# Patient Record
Sex: MALE | Race: WHITE | NOT HISPANIC OR LATINO | URBAN - METROPOLITAN AREA
[De-identification: names, ages, dates, MRNs, and addresses within clinical notes are randomized per-mention and may not be internally consistent; named-entity substitution may affect disease eponyms.]

---

## 2023-04-18 ENCOUNTER — EMERGENCY (EMERGENCY)
Facility: HOSPITAL | Age: 58
LOS: 1 days | Discharge: HOME | End: 2023-04-18
Attending: STUDENT IN AN ORGANIZED HEALTH CARE EDUCATION/TRAINING PROGRAM | Admitting: STUDENT IN AN ORGANIZED HEALTH CARE EDUCATION/TRAINING PROGRAM
Payer: COMMERCIAL

## 2023-04-18 VITALS
DIASTOLIC BLOOD PRESSURE: 92 MMHG | OXYGEN SATURATION: 100 % | RESPIRATION RATE: 17 BRPM | SYSTOLIC BLOOD PRESSURE: 154 MMHG | TEMPERATURE: 98 F | HEART RATE: 73 BPM

## 2023-04-18 PROCEDURE — 99284 EMERGENCY DEPT VISIT MOD MDM: CPT

## 2023-04-18 PROCEDURE — 93010 ELECTROCARDIOGRAM REPORT: CPT

## 2023-04-18 NOTE — ED ADULT TRIAGE NOTE - CHIEF COMPLAINT QUOTE
pt c/o numbness to b/l thumb, pointer and middle finger and "puffiness" to the tongue pt speaking in clear sentences, no stridor or slurring noted.

## 2023-04-18 NOTE — ED PROVIDER NOTE - CLINICAL SUMMARY MEDICAL DECISION MAKING FREE TEXT BOX
58M, no sig pmh, who presents with b/l paresthesias over digits 1/2/3. Physical exam and story potentially consistent with carpal tunnel syndrome especially given that patient is on his computer for extended periods of time. Sent in for HCT.

## 2023-04-18 NOTE — ED PROVIDER NOTE - OBJECTIVE STATEMENT
58M, no sig pmh, who presents with finger pain/numbness. For the past few weeks, reports paresthesias and discomfort over his 1/2/3 bilaterally. 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 Otherwise, denies headaches, fevers, chills, cough, sputum, cp, sob, abdominal pain, nvd, dysuria, hematuria, recent travel, trauma, syncope, black/bloody stools. No hx of strokes. Denies hx of htn/dm or vascular disease.

## 2023-04-18 NOTE — ED PROVIDER NOTE - PATIENT PORTAL LINK FT
You can access the FollowMyHealth Patient Portal offered by [facility] by registering at the following website: [email or web address]. By joining [employer]’s FollowMyHealth portal, you will also be able to view your health information using other applications (apps) compatible with our system.

## 2023-04-18 NOTE — ED PROVIDER NOTE - PHYSICAL EXAMINATION
Afebrile, well appearing, rrr, ctabl, abdomen soft, no le edema, gomez spontaneously, stable gait  Both Hands: SILT over MRU, no scaphoid ttp, FDP/FDS intact, AIN/PIN/U intact,

## 2023-04-19 VITALS
DIASTOLIC BLOOD PRESSURE: 94 MMHG | HEART RATE: 75 BPM | OXYGEN SATURATION: 99 % | SYSTOLIC BLOOD PRESSURE: 155 MMHG | RESPIRATION RATE: 16 BRPM

## 2023-04-19 PROCEDURE — 70450 CT HEAD/BRAIN W/O DYE: CPT | Mod: 26,MG

## 2023-04-19 PROCEDURE — G1004: CPT

## 2023-04-19 NOTE — ED ADULT NURSE NOTE - ED STAT RN HANDOFF DETAILS
Pt received from intake pending imaging results. A&O x 4, resting comfortably. VSS. Respirations even and unlabored. Cleared for d/c.

## 2023-04-26 ENCOUNTER — EMERGENCY (EMERGENCY)
Facility: HOSPITAL | Age: 58
LOS: 1 days | Discharge: HOME | End: 2023-04-26
Attending: EMERGENCY MEDICINE | Admitting: EMERGENCY MEDICINE
Payer: COMMERCIAL

## 2023-04-26 VITALS
DIASTOLIC BLOOD PRESSURE: 101 MMHG | TEMPERATURE: 98 F | SYSTOLIC BLOOD PRESSURE: 150 MMHG | OXYGEN SATURATION: 100 % | HEART RATE: 103 BPM | RESPIRATION RATE: 17 BRPM

## 2023-04-26 VITALS
OXYGEN SATURATION: 100 % | RESPIRATION RATE: 16 BRPM | HEART RATE: 92 BPM | DIASTOLIC BLOOD PRESSURE: 83 MMHG | SYSTOLIC BLOOD PRESSURE: 164 MMHG | TEMPERATURE: 98 F

## 2023-04-26 LAB
ALBUMIN SERPL ELPH-MCNC: 4.4 G/DL — SIGNIFICANT CHANGE UP (ref 3.3–5)
ALP SERPL-CCNC: 74 U/L — SIGNIFICANT CHANGE UP (ref 40–120)
ALT FLD-CCNC: 32 U/L — SIGNIFICANT CHANGE UP (ref 4–41)
ANION GAP SERPL CALC-SCNC: 13 MMOL/L — SIGNIFICANT CHANGE UP (ref 7–14)
AST SERPL-CCNC: 23 U/L — SIGNIFICANT CHANGE UP (ref 4–40)
BASOPHILS # BLD AUTO: 0.09 K/UL — SIGNIFICANT CHANGE UP (ref 0–0.2)
BASOPHILS NFR BLD AUTO: 0.9 % — SIGNIFICANT CHANGE UP (ref 0–2)
BILIRUB SERPL-MCNC: 0.4 MG/DL — SIGNIFICANT CHANGE UP (ref 0.2–1.2)
BUN SERPL-MCNC: 15 MG/DL — SIGNIFICANT CHANGE UP (ref 7–23)
CALCIUM SERPL-MCNC: 9.6 MG/DL — SIGNIFICANT CHANGE UP (ref 8.4–10.5)
CHLORIDE SERPL-SCNC: 102 MMOL/L — SIGNIFICANT CHANGE UP (ref 98–107)
CO2 SERPL-SCNC: 23 MMOL/L — SIGNIFICANT CHANGE UP (ref 22–31)
CREAT SERPL-MCNC: 1.08 MG/DL — SIGNIFICANT CHANGE UP (ref 0.5–1.3)
EGFR: 79 ML/MIN/1.73M2 — SIGNIFICANT CHANGE UP
EOSINOPHIL # BLD AUTO: 0.09 K/UL — SIGNIFICANT CHANGE UP (ref 0–0.5)
EOSINOPHIL NFR BLD AUTO: 0.9 % — SIGNIFICANT CHANGE UP (ref 0–6)
GLUCOSE SERPL-MCNC: 99 MG/DL — SIGNIFICANT CHANGE UP (ref 70–99)
HCT VFR BLD CALC: 42.8 % — SIGNIFICANT CHANGE UP (ref 39–50)
HGB BLD-MCNC: 14.3 G/DL — SIGNIFICANT CHANGE UP (ref 13–17)
IANC: 7.08 K/UL — SIGNIFICANT CHANGE UP (ref 1.8–7.4)
LYMPHOCYTES # BLD AUTO: 1.75 K/UL — SIGNIFICANT CHANGE UP (ref 1–3.3)
LYMPHOCYTES # BLD AUTO: 17.4 % — SIGNIFICANT CHANGE UP (ref 13–44)
MCHC RBC-ENTMCNC: 30.3 PG — SIGNIFICANT CHANGE UP (ref 27–34)
MCHC RBC-ENTMCNC: 33.4 GM/DL — SIGNIFICANT CHANGE UP (ref 32–36)
MCV RBC AUTO: 90.7 FL — SIGNIFICANT CHANGE UP (ref 80–100)
MONOCYTES # BLD AUTO: 0.69 K/UL — SIGNIFICANT CHANGE UP (ref 0–0.9)
MONOCYTES NFR BLD AUTO: 6.9 % — SIGNIFICANT CHANGE UP (ref 2–14)
NEUTROPHILS # BLD AUTO: 7.43 K/UL — HIGH (ref 1.8–7.4)
NEUTROPHILS NFR BLD AUTO: 73.9 % — SIGNIFICANT CHANGE UP (ref 43–77)
PLATELET # BLD AUTO: 111 K/UL — LOW (ref 150–400)
POTASSIUM SERPL-MCNC: 3.9 MMOL/L — SIGNIFICANT CHANGE UP (ref 3.5–5.3)
POTASSIUM SERPL-SCNC: 3.9 MMOL/L — SIGNIFICANT CHANGE UP (ref 3.5–5.3)
PROT SERPL-MCNC: 7 G/DL — SIGNIFICANT CHANGE UP (ref 6–8.3)
RBC # BLD: 4.72 M/UL — SIGNIFICANT CHANGE UP (ref 4.2–5.8)
RBC # FLD: 11.7 % — SIGNIFICANT CHANGE UP (ref 10.3–14.5)
SODIUM SERPL-SCNC: 138 MMOL/L — SIGNIFICANT CHANGE UP (ref 135–145)
WBC # BLD: 10.06 K/UL — SIGNIFICANT CHANGE UP (ref 3.8–10.5)
WBC # FLD AUTO: 10.06 K/UL — SIGNIFICANT CHANGE UP (ref 3.8–10.5)

## 2023-04-26 PROCEDURE — 99284 EMERGENCY DEPT VISIT MOD MDM: CPT

## 2023-04-26 NOTE — ED PROVIDER NOTE - CLINICAL SUMMARY MEDICAL DECISION MAKING FREE TEXT BOX
58-year-old male with no stated past medical history presenting to the ER with abnormal CT results.  Patient was seen in the ED 1 week ago with bilateral tingling/numbness to digits 1 through 3 on hands bilaterally, left more than right.  Patient also reports 2 episodes of feeling left-sided facial numbness over the past week.  Patient had CT head performed in the ED and following up with a neurologist.  This morning he had CT angio head and neck performed, patient states 3 hours later he was called by his neurologist Dr. Beata Chapman and told he needs to come to the ER for "blockages".  Pt is currently asymptomatic, vitals within normal, non focal neuro exam. Plan: attempt to get copy of outpt imaging, if unable probable rpt CTa head/neck, neuro consult.

## 2023-04-26 NOTE — CONSULT NOTE ADULT - SUBJECTIVE AND OBJECTIVE BOX
HPI:  58-year-old male ambidextrous (favors LH) former smoker (7 py), HLD, chronic neck pain presents to the ER with abnormal CT results. Patient was seen in the ED 1 week ago with intermittent bilateral tingling/numbness to digits 1 through 3 on hands bilaterally, left significantly more than right, since 4/11/23. Pt reports that it happens ~2x/day and is worse when typing, does not wake up with symptoms. He has been using wrist splints intermittently. On 4/18/23, pt reports an episode of 2-3 mins of L 2nd digit painful paresthesias and difficulty speaking (although he was speaking full sentences to his mother, no dysarthria noted, pt's mother at bedside confirmed). Pt is a rodriguez . Pt unable to obtain MRI due to R ear steel surgical staple.     Pt saw neurologist Dr. Beata Jimenez. This morning he had CT angio head and neck performed, patient states 3 hours later he was called by his neurologist and told he needs to come to the ER for "blockages" and was started on aspirin 81mg daily today. Pt unable to obtain MRI due to R ear steel surgical staple. Patient denies symptoms at present.  Patient denies numbness/tingling, weakness, difficulty speaking or swallowing, dizziness, feeling off balance walking, CP, SOB, abdominal pain, N/V/D, recent travel or illness or any other concerns.     REVIEW OF SYSTEMS  General: No fevers, chills  HEENT: No acute visual changes  CVS: No chest pain, palpitations  Resp: No cough, dyspnea, wheezing  GI: No abdominal pain, nausea, vomiting  : No dysuria, frequency  MSK: No joint pain or swelling  Ext: No edema, no claudication  Skin: No rashes or itching  Heme: No easy bruising or petechiae  Neuro: No confusion, LOC  Endocrine: No excessive heat or cold symptoms,     A 10-system ROS was performed and is negative except for those items noted above and/or in the HPI.    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)  Occupation: bass     MEDICATIONS (HOME):  Home Medications:    MEDICATIONS  (STANDING):    MEDICATIONS  (PRN):    ALLERGIES/INTOLERANCES:  Allergies  Allergy Status Unknown    Intolerances    VITALS & EXAMINATION:  Vital Signs Last 24 Hrs  T(C): 36.7 (26 Apr 2023 17:14), Max: 36.7 (26 Apr 2023 17:14)  T(F): 98 (26 Apr 2023 17:14), Max: 98 (26 Apr 2023 17:14)  HR: 103 (26 Apr 2023 17:14) (103 - 103)  BP: 150/101 (26 Apr 2023 17:14) (150/101 - 150/101)  BP(mean): --  RR: 17 (26 Apr 2023 17:14) (17 - 17)  SpO2: 100% (26 Apr 2023 17:14) (100% - 100%)    Parameters below as of 26 Apr 2023 17:14  Patient On (Oxygen Delivery Method): room air      General:  Constitutional: Obese Male, appears stated age, in no apparent distress including pain  Head: Normocephalic & atraumatic.  Respiratory: No increased work of breahting  Extremities: No cyanosis, clubbing, or edema.  Skin: No rashes, bruising, or discoloration.    Neurological (>12):  MS: Awake, alert, oriented to person, place, situation, time. Normal affect. Follows all commands.    Language: Speech is clear, fluent with good repetition & comprehension (able to name objects mask, thumb)    CNs: PERRL (R = 3mm, L = 3mm). VFF. EOMI no nystagmus, no diplopia. V1-3 intact to LT/pinprick, well developed masseter muscles b/l. No facial asymmetry b/l, full eye closure strength b/l. Hearing grossly normal (rubbing fingers) b/l but slightly decreased on R. Symmetric palate elevation in midline. Gag reflex deferred. Head turning & shoulder shrug intact b/l. Tongue midline, normal movements, no atrophy.    Motor: Normal muscle bulk & tone. No noticeable tremor or seizure. No pronator drift.              Deltoid	Biceps	Triceps	Wrist	Finger ABd	   R	5	5	5	4+	4+		4+ 	  L	5	5	5	5	5		5    	H-Flex	K-Flex	K-Ext	D-Flex	P-Flex  R	5	5	5	5	5	  L	5	5	5	5	5		     Sensation: Intact to LT/Temp b/l throughout. Slightly decreased to PP over R thumb palmar side.    Cortical: Extinction on DSS (neglect): none    Reflexes:              Biceps(C5)       BR(C6)     Triceps(C7)               Patellar(L4)    Achilles(S1)    Plantar Resp  R	2	          2	             2		        2		    1		Down   L	2	          2	             2		        2		    1		Down     Coordination: intact rapid-alt movements. No dysmetria to FTN    Gait:  Normal stance and gait.     LABORATORY:       STUDIES & IMAGING:    < from: CT Head No Cont (04.19.23 @ 00:56) >  IMPRESSION:    No acute intracranial pathology.    < end of copied text >    CTA report 4/26/23 [facility]:  IMPRESSION: Moderate to severe stenosis of supraclinoid right internal carotid artery, severe stenosis of the M1 segment of right middle cerebral artery.     Bilateral cavernous carotid artery calcifications are seen. HPI:  58-year-old male ambidextrous (favors LH) former smoker (7 py), HLD, chronic neck pain presents to the ER with abnormal CT results. Patient was seen in the ED 1 week ago with intermittent bilateral tingling/numbness to digits 1 through 3 on hands bilaterally, left significantly more than right, since 4/11/23. Pt reports that it happens ~2x/day and is worse when typing, does not wake up with symptoms. He has been using wrist splints intermittently. On 4/18/23, pt reports an episode of 2-3 mins of L 2nd digit painful paresthesias and difficulty speaking (although he was speaking full sentences to his mother, no dysarthria noted, pt's mother at bedside confirmed). 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 Pt unable to obtain MRI due to R ear steel surgical staple. Pt denies any prior stroke like symptoms, including difficulty ambulating, one sided weakness or numbness, dizziness, vision changes, speech disturbance.     Pt saw neurologist Dr. Beata Jimenez. This morning he had CT angio head and neck performed, patient states 3 hours later he was called by his neurologist and told he needs to come to the ER for "blockages" and was started on aspirin 81mg daily today. Pt unable to obtain MRI due to R ear steel surgical staple. Patient denies symptoms at present.  Patient denies numbness/tingling, weakness, difficulty speaking or swallowing, dizziness, feeling off balance walking, CP, SOB, abdominal pain, N/V/D, recent travel or illness or any other concerns.     REVIEW OF SYSTEMS  General: No fevers, chills  HEENT: No acute visual changes  CVS: No chest pain, palpitations  Resp: No cough, dyspnea, wheezing  GI: No abdominal pain, nausea, vomiting  : No dysuria, frequency  MSK: No joint pain or swelling  Ext: No edema, no claudication  Skin: No rashes or itching  Heme: No easy bruising or petechiae  Neuro: No confusion, LOC  Endocrine: No excessive heat or cold symptoms,     A 10-system ROS was performed and is negative except for those items noted above and/or in the HPI.    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)  Occupation: bass     MEDICATIONS (HOME):  Home Medications:    MEDICATIONS  (STANDING):    MEDICATIONS  (PRN):    ALLERGIES/INTOLERANCES:  Allergies  Allergy Status Unknown    Intolerances    VITALS & EXAMINATION:  Vital Signs Last 24 Hrs  T(C): 36.7 (26 Apr 2023 17:14), Max: 36.7 (26 Apr 2023 17:14)  T(F): 98 (26 Apr 2023 17:14), Max: 98 (26 Apr 2023 17:14)  HR: 103 (26 Apr 2023 17:14) (103 - 103)  BP: 150/101 (26 Apr 2023 17:14) (150/101 - 150/101)  BP(mean): --  RR: 17 (26 Apr 2023 17:14) (17 - 17)  SpO2: 100% (26 Apr 2023 17:14) (100% - 100%)    Parameters below as of 26 Apr 2023 17:14  Patient On (Oxygen Delivery Method): room air      General:  Constitutional: Obese Male, appears stated age, in no apparent distress including pain  Head: Normocephalic & atraumatic.  Respiratory: No increased work of breahting  Extremities: No cyanosis, clubbing, or edema.  Skin: No rashes, bruising, or discoloration.    Neurological (>12):  MS: Awake, alert, oriented to person, place, situation, time. Normal affect. Follows all commands.    Language: Speech is clear, fluent with good repetition & comprehension (able to name objects mask, thumb)    CNs: PERRL (R = 3mm, L = 3mm). VFF. EOMI no nystagmus, no diplopia. V1-3 intact to LT/pinprick, well developed masseter muscles b/l. No facial asymmetry b/l, full eye closure strength b/l. Hearing grossly normal (rubbing fingers) b/l but slightly decreased on R. Symmetric palate elevation in midline. Gag reflex deferred. Head turning & shoulder shrug intact b/l. Tongue midline, normal movements, no atrophy.    Motor: Normal muscle bulk & tone. No noticeable tremor or seizure. No pronator drift.              Deltoid	Biceps	Triceps	Wrist	Finger ABd	   R	5	5	5	4+	4+		4+ 	  L	5	5	5	5	5		5    	H-Flex	K-Flex	K-Ext	D-Flex	P-Flex  R	5	5	5	5	5	  L	5	5	5	5	5		     Sensation: Intact to LT/Temp b/l throughout. Slightly decreased to PP over R thumb palmar side.    Cortical: Extinction on DSS (neglect): none    Reflexes:              Biceps(C5)       BR(C6)     Triceps(C7)               Patellar(L4)    Achilles(S1)    Plantar Resp  R	2	          2	             2		        2		    1		Down   L	2	          2	             2		        2		    1		Down     Coordination: intact rapid-alt movements. No dysmetria to FTN    Gait:  Normal stance and gait.     LABORATORY:       STUDIES & IMAGING:    < from: CT Head No Cont (04.19.23 @ 00:56) >  IMPRESSION:    No acute intracranial pathology.    < end of copied text >    CTA report 4/26/23 [facility]:  IMPRESSION: Moderate to severe stenosis of supraclinoid right internal carotid artery, severe stenosis of the M1 segment of right middle cerebral artery.     Bilateral cavernous carotid artery calcifications are seen.

## 2023-04-26 NOTE — ED PROVIDER NOTE - CARE PROVIDER_API CALL
Adria Matthews)  Neurology; Vascular Neurology  [address] [facility] [facility] [address] [facility] [address], [address]  [place] [address]  Phone: [phone]  Fax: [phone]  Follow Up Time:

## 2023-04-26 NOTE — CONSULT NOTE ADULT - ASSESSMENT
58-year-old male ambidextrous (favors LH) former smoker (7 py), HLD presents to the ER with abnormal CT results. Patient was seen in the ED 1 week ago with intermittent bilateral tingling/numbness to digits 1 through 3 on hands bilaterally, left significantly more than right, since 4/11/23. Pt reports that it happens ~2x/day and is worse when typing, does not wake up with symptoms. Pt saw neurologist Dr. Beata Jimenez. This morning he had CT angio head and neck performed, patient states 3 hours later he was called by his neurologist and told he needs to come to the ER for "blockages".      Impression: b/l paresthesias and numbness likely 2/2 carpal tunnel. Multifocal ICAD, asymptomatic    Recommendations:  [] outpatient EMG/NCS and wrist splints for likely   [] Pt unable to obtain MRI due to R ear steel surgical staple  [] continue with ASA 81 mg PO daily   [] stroke risk factor modification and counseling; pt speficially counseled on smoking cessation, blood pressure log, cholesterol control, weight loss  [x] HA1c 5.5%, LDL 86 (total cholesterol 205) per outpatient labs  [] NPO until bedside dysphagia screen  [] Patient can follow up with Dr. Jimenez or Dr. Adria Matthews after discharge. Please instruct the patient to call [phone] to schedule an appointment within the next 2-3 days. Office is located at [address] [place] [address].    Case to be discussed with Dr. Beatty      58-year-old male ambidextrous (favors LH) former smoker (7 py), HLD presents to the ER with abnormal CT results. Patient was seen in the ED 1 week ago with intermittent bilateral tingling/numbness to digits 1 through 3 on hands bilaterally, left significantly more than right, since 4/11/23. Pt saw neurologist Dr. Beata Jimenez. This morning he had CT angio head and neck performed, patient states 3 hours later he was called by his neurologist and told he needs to come to the ER for "blockages".      Impression: intermittent b/l (L>>R) 1-3 digit paresthesias and numbness likely 2/2 b/l carpal tunnel. Multifocal ICAD, asymptomatic    Recommendations:  [] outpatient EMG/NCS and wrist splints   [] Pt unable to obtain MRI due to R ear steel surgical staple  [] continue with ASA 81 mg PO daily   [] stroke risk factor modification and counseling; pt specifically counseled on smoking cessation, blood pressure log, cholesterol control, weight loss  [x] HA1c 5.5%, LDL 86 (total cholesterol 205) per outpatient labs  [] NPO until bedside dysphagia screen  [] Patient can follow up with Dr. Beata Jimenez or Dr. Adria Matthews after discharge. Please instruct the patient to call [phone] to schedule an appointment within the next 2-3 days. Office is located at [address] [place] [address].    Case discussed with Dr. Beatty      58-year-old male ambidextrous (favors LH) former smoker (7 py), HLD presents to the ER with abnormal CT results. Patient was seen in the ED 1 week ago with intermittent bilateral tingling/numbness to digits 1 through 3 on hands bilaterally, left significantly more than right, since 4/11/23. Pt saw neurologist Dr. Beata Jimenez. This morning he had CT angio head and neck performed, patient states 3 hours later he was called by his neurologist and told he needs to come to the ER for "blockages".      Impression: intermittent b/l (L>>R) 1-3 digit paresthesias and numbness likely 2/2 b/l carpal tunnel. Multifocal ICAD, asymptomatic    Recommendations:  [] outpatient EMG/NCS and wrist splints   [] Pt unable to obtain MRI due to R ear steel surgical staple  [] continue with ASA 81 mg PO daily, statin  [] stroke risk factor modification and counseling; pt specifically counseled on smoking cessation, blood pressure log, cholesterol control, weight loss  [x] HA1c 5.5%, LDL 86 (total cholesterol 205) per outpatient labs  [] NPO until bedside dysphagia screen  [] Patient can follow up with Dr. Beata Jimenez or Dr. Adria Matthews after discharge. Please instruct the patient to call [phone] to schedule an appointment within the next 2-3 days. Office is located at [address] [place] [address], [place] [address].    Case discussed with Dr. Beatty

## 2023-04-26 NOTE — ED PROVIDER NOTE - OBJECTIVE STATEMENT
58-year-old male with no stated past medical history presenting to the ER with abnormal CT results.  Patient was seen in the ED 1 week ago with bilateral tingling/numbness to digits 1 through 3 on hands bilaterally, left more than right.  Patient also reports 2 episodes of feeling left-sided facial numbness over the past week.  Patient had CT head performed in the ED and following up with a neurologist.  This morning he had CT angio head and neck performed, patient states 3 hours later he was called by his neurologist Dr. Beata Chapman and told he needs to come to the ER for "blockages".  Patient denies symptoms at present.  Patient denies numbness/tingling, weakness, difficulty speaking or swallowing, dizziness, feeling off balance walking, CP, SOB, abdominal pain, N/V/D, recent travel or illness or any other concerns.

## 2023-04-26 NOTE — ED ADULT NURSE NOTE - OBJECTIVE STATEMENT
Break RN: Pt is a 57 y/o Male, A&Ox4, ambulatory with a no reported PMH. Pt sent to the ED for abnormal CT scan. Pt states he was recently in ED Respirations even and unlabored, chest rise equal b/l. NSR noted on cardiac monitor. VS as noted in flow sheets. Pt denies chest pain, SOB, fever, cough, chills, abdominal pain, N/V, h/a, dizziness or any urinary symptoms at this time. Safety maintained throughout. Will continue to monitor. Break RN: Pt is a 57 y/o Male, A&Ox4, ambulatory with a no reported PMH. Pt sent to the ED for abnormal CT scan. Pt states he received a CT head and neck that showed multiple "blockages" in vessels. Pt states he was recently seen in ED a week ago for numbness/tingling in digits 1-3 in b/l hands. Pt states he had 2 episodes of left-sided facial numbness over the past week but denies any symptoms at this time. Neuro/sensory intact, strength equal b/l. Respirations even and unlabored, chest rise equal b/l. Abdomen soft, nontender, nondistended. Skin warm, dry and intact. VS as noted in flow sheets. Pt denies chest pain, SOB, fever, cough, chills, abdominal pain, N/V, h/a, dizziness, numbness/tingling, weakness or any urinary symptoms at this time. 20g IVL placed in LAC. Labs collected and sent. Pt awaiting neuro eval. Safety maintained throughout. Will continue to monitor.

## 2023-04-26 NOTE — ED PROVIDER NOTE - PROGRESS NOTE DETAILS
YEMI Valencia: Spoke with neurologist  reports impression on CTA head and neck performed today as "moderate to severe stenosis of supraclinoid right internal carotid and severe stenosis M1 segment of right MCA".  He reports that the imaging center will be sending over a fax, states they have a disc prepared and are open until 7:30 PM if a friend or family member is able to  the disc for the patient.  Discussed with patient that we will need to have imaging to be able to review either via CD or repeating CT angio head and neck in ED today.  Patient's friend at bedside reports she will go to imaging center at [address] to  the disc.  Spoke with neurology who will consult and ENT. ADAM:  Neurology saw patient with no recommendation for admission or procedure.  Neurology impression is that patient can follow-up outpatient and continue management.  Return precautions given.

## 2023-04-26 NOTE — ED PROVIDER NOTE - NS ED ATTENDING STATEMENT MOD
Attending with This was a shared visit with the ELINOR. I reviewed and verified the documentation and independently performed the documented:

## 2023-04-26 NOTE — ED PROVIDER NOTE - PATIENT PORTAL LINK FT
You can access the FollowMyHealth Patient Portal offered by [facility] by registering at the following website: [email or web address]. By joining [employer]’s FollowMyHealth portal, you will also be able to view your health information using other applications (apps) compatible with our system.

## 2023-04-26 NOTE — ED PROVIDER NOTE - NSFOLLOWUPINSTRUCTIONS_ED_ALL_ED_FT
WHAT YOU NEED TO KNOW:    Please continue aspirin 81 mg daily.       Paresthesia is numbness, tingling, or burning. It can happen in any part of your body, but usually occurs in your legs, feet, arms, or hands.    DISCHARGE INSTRUCTIONS:    Return to the emergency department if:     You have severe pain along with numbness and tingling.  Your legs suddenly become cold. You have trouble moving your legs, and they ache.  You have increased weakness in a part of your body.  You have uncontrolled movements.    Contact your healthcare provider or neurologist if:     Your symptoms do not improve.  You have symptoms in more than one part of your body.  You have questions or concerns about your condition or care.    Manage paresthesia:     Protect the area from injury. You may injure or burn yourself if you lose feeling in the area. Be careful when you touch anything that could be hot. Wear sturdy shoes to protect your feet. Ask about other ways to protect yourself.   Go to physical or occupational therapy if directed. Your provider may recommend therapy if you have a condition such as carpal tunnel syndrome. A physical therapist can teach you exercises to help strengthen the area or increase your ability to move it. An occupational therapist can help you find new ways to do your daily activities.  Manage health conditions that can cause paresthesia. Work with your diabetes specialist if you have uncontrolled diabetes. A dietitian or your healthcare provider can help you create a meal plan if you have low vitamin B levels. Your provider can help you manage your health if you have multiple sclerosis or you had a stroke. It is important to manage health conditions to stop paresthesia or prevent it from getting worse.  Follow up with your healthcare provider or neurologist as directed: Your healthcare provider may refer you to a specialist. Write down your questions so you remember to ask them during your visits.

## 2023-04-26 NOTE — ED PROVIDER NOTE - ATTENDING CONTRIBUTION TO CARE
Brief HPI:  58-year-old male no significant past medical history presents to the ER for abnormal CTA results.  Patient was seen in the ED approximately 1 week ago for bilateral paresthesias of digits 1-3 on hand, left more than right.  Patient also reported feeling 2 episodes of left-sided facial numbness over the past week.  Patient is on outpatient neurologist and had a CTA of his head and neck.  He was subsequently contacted by the neurologist and told to go to the ED immediately due to blockages in the blood vessels.  Patient does not have the report of this study.  Patient at present reports paresthesia of the palmar aspect of digits 1-3.    Vitals:   Reviewed    Exam:    GEN:  Non-toxic appearing, non-distressed, speaking full sentences, non-diaphoretic, AAOx3  HEENT:  NCAT, neck supple, EOMI, PERRLA, sclera anicteric, no conjunctival pallor or injection, no stridor, normal voice, no tonsillar exudate, uvula midline  CV:  regular rhythm and rate, s1/s2 audible, no murmurs, rubs or gallops, peripheral pulses 2+ and symmetric  PULM:  non-labored respirations, lungs clear to auscultation bilaterally, no wheezes, crackles or rales  ABD:  non distended, non-tender, no rebound, no guarding, negative Silva's sign, bowel sounds normal, no cvat  EXT:  No deformity or laceration.  Non-tender throughout.  FDP/FDS/Extensors intact in digits 2-5.  APL/FPL/EPB intact in digit 1.  AIN/PIN/U 5/5.  SILT m/r/u.  Two point discrimination intact in all digits.  Radial pulse 2+.  Brisk cap refill in all digits.  NEURO:  AAOx3, CN II-XII intact, motor 5/5 in upper and lower extremities bilaterally, sensation grossly intact in extremities and trunk, finger to nose testing wnl, no nystagmus, negative Romberg, no pronator drift, no gait deficit  SKIN:  warm, dry, no rash or vesicles     A/P:  58-year-old male no significant past medical history presents to the ER for abnormal CTA results.   Reports paresthesias of bilateral hands, and face but is currently asymptomatic.  Will obtain neurology consult and attempt to obtain imaging and possible report.  Low concern for acute CVA at this time.  Disposition pending.

## 2023-04-26 NOTE — ED ADULT TRIAGE NOTE - CHIEF COMPLAINT QUOTE
Pt sent neurologist after out patient CT showed numerous "blockages" in vessels. Pt was having CT for symptoms of tingling and numbness in hands.

## 2023-04-26 NOTE — ED PROVIDER NOTE - DISPOSITION TYPE
Bedside and Verbal shift change report given to KATHIA Lima (oncoming nurse) by Michele Blanco (offgoing nurse). Report included the following information SBAR and Kardex. DISCHARGE